# Patient Record
Sex: MALE | URBAN - METROPOLITAN AREA
[De-identification: names, ages, dates, MRNs, and addresses within clinical notes are randomized per-mention and may not be internally consistent; named-entity substitution may affect disease eponyms.]

---

## 2021-05-30 ENCOUNTER — RECORDS - HEALTHEAST (OUTPATIENT)
Dept: ADMINISTRATIVE | Facility: CLINIC | Age: 26
End: 2021-05-30

## 2023-01-07 ENCOUNTER — OFFICE VISIT (OUTPATIENT)
Dept: URGENT CARE | Facility: URGENT CARE | Age: 28
End: 2023-01-07
Payer: COMMERCIAL

## 2023-01-07 VITALS
DIASTOLIC BLOOD PRESSURE: 75 MMHG | SYSTOLIC BLOOD PRESSURE: 136 MMHG | RESPIRATION RATE: 16 BRPM | HEART RATE: 65 BPM | OXYGEN SATURATION: 99 % | TEMPERATURE: 97.7 F | WEIGHT: 230 LBS

## 2023-01-07 DIAGNOSIS — K13.79 MOUTH PAIN: Primary | ICD-10-CM

## 2023-01-07 PROCEDURE — 99203 OFFICE O/P NEW LOW 30 MIN: CPT

## 2023-01-07 RX ORDER — IBUPROFEN 600 MG/1
TABLET, FILM COATED ORAL
COMMUNITY
Start: 2023-01-04

## 2023-01-07 RX ORDER — CHLORHEXIDINE GLUCONATE ORAL RINSE 1.2 MG/ML
15 SOLUTION DENTAL 2 TIMES DAILY
Qty: 473 ML | Refills: 0 | Status: SHIPPED | OUTPATIENT
Start: 2023-01-07 | End: 2023-01-14

## 2023-01-07 RX ORDER — HYDROCODONE BITARTRATE AND ACETAMINOPHEN 5; 325 MG/1; MG/1
1 TABLET ORAL EVERY 6 HOURS PRN
COMMUNITY
Start: 2023-01-04

## 2023-01-07 RX ORDER — DIPHENHYDRAMINE HYDROCHLORIDE AND LIDOCAINE HYDROCHLORIDE AND ALUMINUM HYDROXIDE AND MAGNESIUM HYDRO
5-10 KIT EVERY 6 HOURS PRN
Qty: 237 ML | Refills: 0 | Status: SHIPPED | OUTPATIENT
Start: 2023-01-07

## 2023-01-07 NOTE — PROGRESS NOTES
URGENT CARE  Assessment & Plan   Assessment:   Jose G Gonzalez is a 27 year old male who's clinical presentation today is consistent with:   1. Mouth pain  - magic mouthwash suspension, diphenhydrAMINE, lidocaine, aluminum-magnesium & simethicone, (FIRST-MOUTHWASH BLM) compounding kit; Swish and swallow 5-10 mLs in mouth every 6 hours as needed for mouth sores  Dispense: 237 mL; Refill: 0  - chlorhexidine (PERIDEX) 0.12 % solution; Swish and spit 15 mLs in mouth 2 times daily for 7 days  Dispense: 473 mL; Refill: 0    No alarm signs or symptoms present   Differential Diagnoses for this patient's CC include    dental abscess,  dental infection   Plan:  No signs of an acute dental or gingival infection today (no signs of abscess, erythremia or edema) but very poor dentition noted), thus, will treat patient's dental pain at this time with supportive and symptomatic measures   Encourage patient to try ibuprofen/Tylenol as needed for pain control.  Also educated patient that there are many over the counter dental preparations that he apply directly to the affected tooth or gum, which are very good in taking away the pain, such as Orajel. Also encouraged her to try a heating pad or hot pack at least 3 times daily, to help relieve the discomfort.  Will prescribe patient 'magic mouth wash', with numbing medications to help relieve her pain and a Peridex solution to help prevent future bacterial overgrowth.   Educated patient he needs to acquire a dental appointment as soon as possible with his dentist for further evaluation following his procedure he had 3 days ago.   We also discussed prevention w/ proper oral hygiene, drinking fluoridated water, and having regular dental checks.   Discussed that patient should return for follow up if he notes any increased swelling, redness, increased facial pain, or an abscess to the gums OR symptoms do not improve after starting today's treatment (or if symptoms worsen) in 7-10  days.    Patient is agreeable to treatment plan and state they will follow-up if symptoms do not improve and/or if symptoms worsen (see patient's AVS 'monitor for' section for specific patient instructions given and discussed regarding what to watch for and when to follow up)    Medications ordered are listed above, please see AVS for patient's specific and personalized discharge instructions given     BHARGAVI Ma [facility]      ______________________________________________________________________        Subjective  Subjective     HPI: Jose G Gonzalez  is a 27 year old  male who presents today for evaluation the following concerns:   Patient presents endorsing dental pain and swelling in the lower right aspect of the jaw; patient endorses he had a tooth extracted by the dentist 3 days ago on 1/4/2023.  At that time patient states he was prescribed Norco and has been taking Tylenol and ibuprofen for pain.  Patient states he used all the Norco and has been taking Tylenol and ibuprofen and is still having pain/throbbing.  Patient presents today wanting to know if we can prescribe more pain medicine for him  They describe their pain as:throbbing   Cause of dental pain: recent dental work, cavities, poor dentition and  poor dental hygiene.   Patient has a history of substance abuse        Allergies   Allergen Reactions     Cephalexin Rash     Penicillins Rash     Per patient     There is no problem list on file for this patient.      Review of Systems:  Pertinent review of systems as reflected in HPI, otherwise negative.     Objective  Objective    Physical Exam:  Vitals:    01/07/23 1156   BP: 136/75   Pulse: 65   Resp: 16   Temp: 97.7  F (36.5  C)   SpO2: 99%   Weight: 104.3 kg (230 lb)      General: Alert and oriented, no acute distress, Vital signs reviewed: afebrile,  normotensive  SKIN: Intact, no rashes  EYES: EOMs intact, PERRLA bilaterally   Conjunctiva: Clear  bilaterally, no injection or erythema present  EARS: TMs intact, translucent gray in color with normal landmarks present no erythema  or bulging tympanic membrane   Canals are without swelling, however have a mild amount of cerumen, no impaction  NOSE:  mucosa moist                No frontal or maxillary sinus tenderness present bilaterally  MOUTH/THROAT:    Mouth:   No intraoral erythema and inflammation noted, poor dentition seen throughout. Caries noted w/ pacheco spots, pits and fissures} located throughout teeth   No fluctuance, no swelling seen, no signs of abscess,    noted tooth extraction site at right side of lower jaw with acute tooth sensitivity to percussion   No signs of infection noted, no purulence  mastication tolerable to provocation, no uvular deviation noted   no friability of gums,  no gingivitis or pulpitis concerns    Throat:     Posterior oropharynx is t without erythremia, exudate, lesions or tonsillar  Edema, no dysphonia   NECK: supple, has full range of motion with no meningeal signs              No lymphadenopathy present  LUNG: normal work of breathing, good respiratory effort without retractions, good air  movement, non labored, inspection reveals normal chest expansion w/  inspiration               I explained my diagnostic considerations and recommendations to the patient, who voiced understanding and agreement with the treatment plan.   All questions were answered.   We discussed potential side effects, risks and benefits of any prescribed or recommended therapies, as well as expectations for response to treatments.  Please see AVS for any patient instructions & handouts given.   Patient was advised to contact the Nurse Care Line, their Primary Care provider, Urgent Care, or the Emergency Department if there are new or worsening symptoms, or call 911 for emergencies.        ______________________________________________________________________          Patient Instructions    Diagnosis: Dental pain from tooth extraction    Today        Plan:      Acetaminophen or ibuprofen     Magic mouth wash} Peridex mouthwash}     Orajel     NEED to see a Dentist ASAP - Monday when opens s    Monitor for:     Fever of 101 F    More or increased redness, drainage, or swelling in the treated area    Face or jawbone swelling     Pain that is worsening             Dental Abscess vs Dental pain   An abscess is a sac of fluid (pus). A dental abscess forms when a tooth or the tissue around it becomes infected with bacteria.   The bacteria can enter through a cavity or a crack in a tooth and causes an infection.    It can also infect the gum tissue or bone around a tooth.   An untreated abscess can cause the loss of the tooth. It can even spread to other parts of the body and become life-threatening.  Symptoms of a dental abscess and Dental pain:     Toothache, often severe    Tooth pain with hot, cold, or pressure    Pain in the gums, cheek, or jaw    Bad breath or bitter taste in the mouth    Trouble swallowing or opening the mouth    Swollen or enlarged neck glands    infection: fever, redness, swelling, pus filled pocket, drainage, abscess

## 2023-01-07 NOTE — PATIENT INSTRUCTIONS
Diagnosis: Dental pain from tooth extraction    Today        Plan:    Acetaminophen or ibuprofen   Magic mouth wash} Peridex mouthwash}   Orajel   NEED to see a Dentist ASAP - Monday when opens s    Monitor for:   Fever of 101 F  More or increased redness, drainage, or swelling in the treated area  Face or jawbone swelling   Pain that is worsening             Dental Abscess vs Dental pain   An abscess is a sac of fluid (pus). A dental abscess forms when a tooth or the tissue around it becomes infected with bacteria.   The bacteria can enter through a cavity or a crack in a tooth and causes an infection.    It can also infect the gum tissue or bone around a tooth.   An untreated abscess can cause the loss of the tooth. It can even spread to other parts of the body and become life-threatening.  Symptoms of a dental abscess and Dental pain:   Toothache, often severe  Tooth pain with hot, cold, or pressure  Pain in the gums, cheek, or jaw  Bad breath or bitter taste in the mouth  Trouble swallowing or opening the mouth  Swollen or enlarged neck glands  infection: fever, redness, swelling, pus filled pocket, drainage, abscess

## 2023-04-21 ENCOUNTER — OFFICE VISIT (OUTPATIENT)
Dept: URGENT CARE | Facility: URGENT CARE | Age: 28
End: 2023-04-21
Payer: OTHER MISCELLANEOUS

## 2023-04-21 ENCOUNTER — ANCILLARY PROCEDURE (OUTPATIENT)
Dept: GENERAL RADIOLOGY | Facility: CLINIC | Age: 28
End: 2023-04-21
Attending: PHYSICIAN ASSISTANT
Payer: COMMERCIAL

## 2023-04-21 VITALS
HEART RATE: 68 BPM | DIASTOLIC BLOOD PRESSURE: 83 MMHG | RESPIRATION RATE: 16 BRPM | TEMPERATURE: 98.4 F | SYSTOLIC BLOOD PRESSURE: 128 MMHG | OXYGEN SATURATION: 96 %

## 2023-04-21 DIAGNOSIS — S93.401A SPRAIN OF RIGHT ANKLE, UNSPECIFIED LIGAMENT, INITIAL ENCOUNTER: Primary | ICD-10-CM

## 2023-04-21 PROCEDURE — 99213 OFFICE O/P EST LOW 20 MIN: CPT | Performed by: PHYSICIAN ASSISTANT

## 2023-04-21 PROCEDURE — 73630 X-RAY EXAM OF FOOT: CPT | Mod: TC | Performed by: RADIOLOGY

## 2023-04-21 PROCEDURE — 73610 X-RAY EXAM OF ANKLE: CPT | Mod: TC | Performed by: RADIOLOGY

## 2023-04-21 RX ORDER — IBUPROFEN 800 MG/1
800 TABLET, FILM COATED ORAL EVERY 8 HOURS PRN
Qty: 30 TABLET | Refills: 0 | Status: SHIPPED | OUTPATIENT
Start: 2023-04-21

## 2023-04-21 NOTE — PROGRESS NOTES
SUBJECTIVE:   Jose G Gonzalez is a 27 year old male presenting with a chief complaint of   Chief Complaint   Patient presents with     Work Comp     Hurt right foot today at work       He is an established patient of [facility].  Patient presents with right foot and ankle pain after rolling the ankle while stepping on some carpeted stairs this morning.  Patient states no immediate pain.  Approximately 30 minutes after incident,  Started to hurt and patient began to have more pain.  Per patient, entire foot and ankle hurts.  No other injuries.  No previous injury to right ankle.     Treatment:  1 g tylenol and 400 mg ibuprofen.        Review of Systems    Past Medical History:   Diagnosis Date     Depressive disorder      Substance abuse (H)      History reviewed. No pertinent family history.  Current Outpatient Medications   Medication Sig Dispense Refill     Acetaminophen (TYLENOL PO) Take 325 mg by mouth       ibuprofen (ADVIL/MOTRIN) 600 MG tablet TAKE 1 TABLET BY MOUTH EVERY 6 TO 8 HOURS AS NEEDED FOR PAIN.       ibuprofen (ADVIL/MOTRIN) 800 MG tablet Take 1 tablet (800 mg) by mouth every 8 hours as needed for moderate pain 30 tablet 0     Buprenorphine HCl-Naloxone HCl (SUBOXONE) 4-1 MG film Place 1 Film under the tongue daily (Patient not taking: Reported on 1/7/2023)       citalopram (CELEXA) 10 MG tablet Take 2 tablets (20 mg) by mouth daily (Patient not taking: Reported on 1/7/2023) 30 tablet 1     HYDROcodone-acetaminophen (NORCO) 5-325 MG tablet Take 1 tablet by mouth every 6 hours as needed (Patient not taking: Reported on 4/21/2023)       magic mouthwash suspension, diphenhydrAMINE, lidocaine, aluminum-magnesium & simethicone, (FIRST-MOUTHWASH BLM) compounding kit Swish and swallow 5-10 mLs in mouth every 6 hours as needed for mouth sores (Patient not taking: Reported on 4/21/2023) 237 mL 0     Social History     Tobacco Use     Smoking status: Former     Packs/day: 0.50     Types: Cigarettes      Smokeless tobacco: Not on file   Vaping Use     Vaping status: Not on file   Substance Use Topics     Alcohol use: No       OBJECTIVE  /83   Pulse 68   Temp 98.4  F (36.9  C) (Tympanic)   Resp 16   SpO2 96%     Physical Exam  Vitals and nursing note reviewed.   Constitutional:       Appearance: Normal appearance. He is normal weight.   Cardiovascular:      Rate and Rhythm: Normal rate.   Musculoskeletal:      Comments: Right foot.:  No edema or ecchymosis.  Slightly decreased ROM at all ROMs.  No tenderness to palpation of the medial or lateral malleolus.  DP present.  Tenderness to palpation of the 5th tuberosity.   Digit ROM normal.    Patient not observed ambulating.  Bunion noted.   Skin:     Capillary Refill: Capillary refill takes less than 2 seconds.   Neurological:      General: No focal deficit present.      Mental Status: He is alert.   Psychiatric:         Mood and Affect: Mood normal.         Labs:  Results for orders placed or performed in visit on 04/21/23 (from the past 24 hour(s))   XR Ankle Right G/E 3 Views    Narrative    ANKLE RIGHT THREE OR MORE VIEWS   DATE/TIME: 4/21/2023 2:58 PM    INDICATION: Sprain of right ankle, unspecified ligament, initial  encounter.    COMPARISON: None available.       Impression    IMPRESSION: Normal joint spaces and alignment. No definite fracture.  No significant soft tissue swelling.      XR Foot Right G/E 3 Views    Narrative    FOOT RIGHT THREE OR MORE VIEWS   DATE/TIME: 4/21/2023 2:58 PM    INDICATION: Sprain of right ankle, unspecified ligament, initial  encounter.    COMPARISON: None available.       Impression    IMPRESSION: Moderate hallux valgus deformity with bunion. No acute  displaced right foot fracture or dislocation. No significant joint  space narrowing. No localizing right foot soft tissue swelling.          X-Ray was done, my findings are: NAD    ASSESSMENT:      ICD-10-CM    1. Sprain of right ankle, unspecified ligament, initial  encounter  S93.401A XR Foot Right G/E 3 Views     XR Ankle Right G/E 3 Views     ibuprofen (ADVIL/MOTRIN) 800 MG tablet     Orthopedic  Referral     Ankle/Foot Bracing Supplies DME Walking Boot; Right; Pneumatic           Medical Decision Making:    Differential Diagnosis:  MS Injury Pain: sprain and fracture    Serious Comorbid Conditions:  Adult:  reviewed    PLAN:    Patient placed in a cam walker.  Ortho referral.  Rx for ibuprofen 800 TID prn with food.  Note for work.      Followup:    If not improving or if condition worsens, follow up with your Primary Care Provider, In 4  day(s) follow up with  Ortho    There are no Patient Instructions on file for this visit.

## 2023-04-21 NOTE — LETTER
April 21, 2023      Jose G Gonzalez  [id number] [facility] [facility] [id number]        To Whom It May Concern:    Jose G Gonzalez was seen in our clinic. He may not return to work until cleared by ortho.        Sincerely,        Maile Grossman

## 2023-04-24 ENCOUNTER — ANCILLARY PROCEDURE (OUTPATIENT)
Dept: GENERAL RADIOLOGY | Facility: CLINIC | Age: 28
End: 2023-04-24
Attending: PEDIATRICS
Payer: COMMERCIAL

## 2023-04-24 ENCOUNTER — OFFICE VISIT (OUTPATIENT)
Dept: ORTHOPEDICS | Facility: CLINIC | Age: 28
End: 2023-04-24
Attending: PHYSICIAN ASSISTANT
Payer: OTHER MISCELLANEOUS

## 2023-04-24 VITALS — HEART RATE: 63 BPM | SYSTOLIC BLOOD PRESSURE: 125 MMHG | DIASTOLIC BLOOD PRESSURE: 70 MMHG | WEIGHT: 230 LBS

## 2023-04-24 DIAGNOSIS — S99.911A INJURY OF RIGHT ANKLE, INITIAL ENCOUNTER: ICD-10-CM

## 2023-04-24 DIAGNOSIS — S93.401A SPRAIN OF RIGHT ANKLE, UNSPECIFIED LIGAMENT, INITIAL ENCOUNTER: ICD-10-CM

## 2023-04-24 PROCEDURE — 99204 OFFICE O/P NEW MOD 45 MIN: CPT | Performed by: PEDIATRICS

## 2023-04-24 PROCEDURE — 73630 X-RAY EXAM OF FOOT: CPT | Mod: TC | Performed by: RADIOLOGY

## 2023-04-24 PROCEDURE — 73610 X-RAY EXAM OF ANKLE: CPT | Mod: TC | Performed by: RADIOLOGY

## 2023-04-24 NOTE — PROGRESS NOTES
ASSESSMENT & PLAN    Jose G was seen today for pain, injury, pain and injury.    Diagnoses and all orders for this visit:    Injury of right ankle, initial encounter  -     Orthopedic  Referral  -     XR Ankle RT G/E 3 vw; Future  -     XR Foot RT G/E 3 vw      This issue is acute and Unchanged.    Likely lateral ankle sprain given negative x-rays.  Recommend supportive care including cam walking boot, crutches, rest, ice, elevation. Follow up in 1-2 weeks for repeat exam. Would consider further imaging at that point pending clinical course. Patient will likely wean to ankle brace and need physical therapy at some point.    Plan:  - Today's Plan of Care:  Walking boot crutches if needed  Continue with relative rest and activity modification, Ice, Compression, and Elevation.  Can apply ice 10-15 minutes 3-4 times per day as needed. OTC medications as needed.    Home Exercise Program    Work Letter    -We also discussed other future treatment options:  Referral to Physical Therapy  Ankle Brace  MRI if not improving    Follow Up: 2 weeks - exam before x-rays    Concerning signs and symptoms were reviewed.  The patient expressed understanding of this management plan and all questions were answered at this time.    Isbaelle Bentley MD [facility]  Sports Medicine Physician  [facility]      -----  Chief Complaint   Patient presents with     Right Ankle - Pain, Injury     Right Foot - Pain, Injury       SUBJECTIVE  Jose G Gonzalez is a/an 27 year old male who is seen as an  referral for evaluation of right ankle injury.  Note; will need to discuss workability     The patient is seen by themselves.    Onset: 3 day(s) ago. Patient describes injury at work. Slipped on carpeted stairs, inverted ankle  Location of Pain: right ankle and foot; talar dome, dorsum of the ankle, and plantar of the foot   Worsened by: walking, Is walking differently to help alleviate pain,  putting pressure, PF/DF   Better  with: cam walking boot, elevation, resting, icing, ibuprofen  Treatments tried: rest/activity avoidance, elevation, ice, ibuprofen, previous imaging (xray 4/21/22) and casting/splinting/bracing  Associated symptoms: weakness of right ankle and feeling of instability    Orthopedic/Surgical history: YES - Date: sprained ankle when he was a kid  Social History/Occupation: works in furniture delivery     No family history pertinent to patient's problem today.    REVIEW OF SYSTEMS:  Review of Systems  Skin: no bruising, yes mild swelling  Musculoskeletal: as above  Neurologic: no numbness, paresthesias  Remainder of review of systems is negative including constitutional, CV, pulmonary, GI, except as noted in HPI or medical history.    OBJECTIVE:  /70   Pulse 63   Wt 104.3 kg (230 lb)    General: healthy, alert and in no distress  HEENT: no scleral icterus or conjunctival erythema  Skin: no suspicious lesions or rash. No jaundice.  CV: distal perfusion intact  Resp: normal respiratory effort without conversational dyspnea   Psych: normal mood and affect  Gait: antalgic  Neuro: Normal light sensory exam of lower extremity    Bilateral Foot and Ankle Exam:    Inspection:       ecchymosis noted mild lateral ankle       edema noted mild lateral ankle    Foot inspection:       no deformity noted    Tender:       lateral ankle ligaments right    Non-Tender:       remainder of ankle and foot right    ROM:      Slightly decreased limited ankle dorsiflexion, plantarflexion, inversion and eversion right    Strength:       ankle dorsiflexion 4/5 right       plantarflexion 4/5 right       inversion 4/5 right       eversion 4/5 right    Special Tests:       neg (-) anterior drawer right       neg (-) talar tilt right       neg (-) external rotation testing right       neg (-) squeeze test right    Gait:       antalgic gait    Neurovascular:       2+ peripheral pulses bilaterally and brisk capillary refill       sensation  grossly intact      RADIOLOGY:  I independently ordered, visualized and reviewed these images with the patient  3 x-rays XR views of left ankle and foot reviewed: no acute bony abnormality, no significant degenerative change, hallux valgus  - will follow official read    Reviewed urgent care note  Review of the result(s) of each unique test - XR

## 2023-04-24 NOTE — LETTER
April 24, 2023      Jose G Gonzalez  [address] [address] [facility] [address]        To Whom It May Concern,     Jose G is under my care for ankle injury, he should be off work at this time.  Follow up will be in 2 weeks      Sincerely,        Isabelle Bentley MD

## 2023-04-24 NOTE — PATIENT INSTRUCTIONS
Likely lateral ankle sprain given negative x-rays.  Recommend supportive care including cam walking boot, crutches, rest, ice, elevation. Follow up in 1-2 weeks for repeat exam. Would consider further imaging at that point pending clinical course. Patient will likely wean to ankle brace and need physical therapy at some point.    Plan:  - Today's Plan of Care:  Walking boot crutches if needed  Continue with relative rest and activity modification, Ice, Compression, and Elevation.  Can apply ice 10-15 minutes 3-4 times per day as needed. OTC medications as needed.    Home Exercise Program    Work Letter    -We also discussed other future treatment options:  Referral to Physical Therapy  Ankle Brace  MRI if not improving    Follow Up: 2 weeks - exam before x-rays    If you have any further questions for your physician or physician s care team you can call [phone] and use option 3 to leave a voice message.

## 2023-04-24 NOTE — LETTER
4/24/2023         RE: Jose G Gonzalez  [address] [address] [address]        Dear Colleague,    Thank you for referring your patient, Jose G Gonzalez, to the [facility]. Please see a copy of my visit note below.    ASSESSMENT & PLAN    Jose G was seen today for pain, injury, pain and injury.    Diagnoses and all orders for this visit:    Injury of right ankle, initial encounter  -     Orthopedic  Referral  -     XR Ankle RT G/E 3 vw; Future  -     XR Foot RT G/E 3 vw      This issue is acute and Unchanged.    Likely lateral ankle sprain given negative x-rays.  Recommend supportive care including cam walking boot, crutches, rest, ice, elevation. Follow up in 1-2 weeks for repeat exam. Would consider further imaging at that point pending clinical course. Patient will likely wean to ankle brace and need physical therapy at some point.    Plan:  - Today's Plan of Care:  Walking boot crutches if needed  Continue with relative rest and activity modification, Ice, Compression, and Elevation.  Can apply ice 10-15 minutes 3-4 times per day as needed. OTC medications as needed.    Home Exercise Program    Work Letter    -We also discussed other future treatment options:  Referral to Physical Therapy  Ankle Brace  MRI if not improving    Follow Up: 2 weeks - exam before x-rays    Concerning signs and symptoms were reviewed.  The patient expressed understanding of this management plan and all questions were answered at this time.    Isabelle Bentley MD [facility]  Sports Medicine Physician  [facility]      -----  Chief Complaint   Patient presents with     Right Ankle - Pain, Injury     Right Foot - Pain, Injury       SUBJECTIVE  Jose G Gonzalez is a/an 27 year old male who is seen as an  referral for evaluation of right ankle injury.  Note; will need to discuss workability     The patient is seen by themselves.    Onset: 3 day(s) ago. Patient  describes injury at work. Slipped on carpeted stairs, inverted ankle  Location of Pain: right ankle and foot; talar dome, dorsum of the ankle, and plantar of the foot   Worsened by: walking, Is walking differently to help alleviate pain,  putting pressure, PF/DF   Better with: cam walking boot, elevation, resting, icing, ibuprofen  Treatments tried: rest/activity avoidance, elevation, ice, ibuprofen, previous imaging (xray 4/21/22) and casting/splinting/bracing  Associated symptoms: weakness of right ankle and feeling of instability    Orthopedic/Surgical history: YES - Date: sprained ankle when he was a kid  Social History/Occupation: works in furniture delivery     No family history pertinent to patient's problem today.    REVIEW OF SYSTEMS:  Review of Systems  Skin: no bruising, yes mild swelling  Musculoskeletal: as above  Neurologic: no numbness, paresthesias  Remainder of review of systems is negative including constitutional, CV, pulmonary, GI, except as noted in HPI or medical history.    OBJECTIVE:  /70   Pulse 63   Wt 104.3 kg (230 lb)    General: healthy, alert and in no distress  HEENT: no scleral icterus or conjunctival erythema  Skin: no suspicious lesions or rash. No jaundice.  CV: distal perfusion intact  Resp: normal respiratory effort without conversational dyspnea   Psych: normal mood and affect  Gait: antalgic  Neuro: Normal light sensory exam of lower extremity    Bilateral Foot and Ankle Exam:    Inspection:       ecchymosis noted mild lateral ankle       edema noted mild lateral ankle    Foot inspection:       no deformity noted    Tender:       lateral ankle ligaments right    Non-Tender:       remainder of ankle and foot right    ROM:      Slightly decreased limited ankle dorsiflexion, plantarflexion, inversion and eversion right    Strength:       ankle dorsiflexion 4/5 right       plantarflexion 4/5 right       inversion 4/5 right       eversion 4/5 right    Special Tests:        neg (-) anterior drawer right       neg (-) talar tilt right       neg (-) external rotation testing right       neg (-) squeeze test right    Gait:       antalgic gait    Neurovascular:       2+ peripheral pulses bilaterally and brisk capillary refill       sensation grossly intact      RADIOLOGY:  I independently ordered, visualized and reviewed these images with the patient  3 x-rays XR views of left ankle and foot reviewed: no acute bony abnormality, no significant degenerative change, hallux valgus  - will follow official read    Reviewed urgent care note  Review of the result(s) of each unique test - XR             Again, thank you for allowing me to participate in the care of your patient.        Sincerely,        Isabelle Bentley MD

## 2023-05-08 ENCOUNTER — OFFICE VISIT (OUTPATIENT)
Dept: ORTHOPEDICS | Facility: CLINIC | Age: 28
End: 2023-05-08
Payer: OTHER MISCELLANEOUS

## 2023-05-08 VITALS — WEIGHT: 230 LBS | SYSTOLIC BLOOD PRESSURE: 132 MMHG | HEART RATE: 63 BPM | DIASTOLIC BLOOD PRESSURE: 77 MMHG

## 2023-05-08 DIAGNOSIS — S93.401D SPRAIN OF RIGHT ANKLE, UNSPECIFIED LIGAMENT, SUBSEQUENT ENCOUNTER: Primary | ICD-10-CM

## 2023-05-08 PROCEDURE — 99213 OFFICE O/P EST LOW 20 MIN: CPT | Performed by: PEDIATRICS

## 2023-05-08 NOTE — LETTER
5/8/2023         RE: Jose G Gonzalez  [address] [facility] [address]        Dear Colleague,    Thank you for referring your patient, Jose G Gonzalez, to the [facility]. Please see a copy of my visit note below.    ASSESSMENT & PLAN    Jose G was seen today for pain and follow up.    Diagnoses and all orders for this visit:    Sprain of right ankle, unspecified ligament, subsequent encounter      This issue is acute and Improving.    Discussed the likely injuries associated with a lateral ankle sprain and typical treatment including rest from irritating activities and pain free weight bearing - transition to ankle brace.  Patient will need a gradual rehabilitation program focusing on range of motion, strengthening and proprioception - referral to physical therapy.  Discussed a gradual return to sports and activities once pain free, no swelling, full range of motion and full strength.  Follow up at this time is only as needed.  Recommended ankle brace use with activities for at least 6 months post injury.     Plan:  - Today's Plan of Care:  Transition to ankle brace  Work Letter  Gradual return to activities as tolerated    Rehab: Physical Therapy: [facility] - [phone]    Discussed activity considerations and other supportive care including Ice/Heat, OTC and other topical medications as needed.    Follow Up: 6 weeks if needed    Concerning signs and symptoms were reviewed.  The patient expressed understanding of this management plan and all questions were answered at this time.    Isabelle Bentley MD [facility]  Sports Medicine Physician  [facility]      SUBJECTIVE- Interim History May 8, 2023    Chief Complaint   Patient presents with     Right Ankle - Pain, Follow Up       Jose G Gonzalez is a 27 year old male who is seen in f/u up for Sprain of right ankle, unspecified ligament, subsequent encounter. Since last visit on 4/24/23  "patient has been doing pretty good. It started feeling better a couple days after he saw us, stopped using the walking boot. It doesn't really bother him, it just feels different now  Note; Will need updated workability note  - Now ~ 2 weeks, 2 days from initial onset    Worsened by: not sure.   Better with: resting, walking boot, elevation, ibuprofen  Treatments tried: elevation, ice, ibuprofen, home exercises, previous imaging (xray 4/2/23) and casting/splinting/bracing  Associated symptoms:  weakness of right ankle and feeling of instability, \"feels different\"     The patient is seen by themselves.    Social History/Occupation: furniture delivery     No family history pertinent to patient's problem today.    REVIEW OF SYSTEMS:  Review of Systems  Skin: no bruising, no swelling  Musculoskeletal: as above  Neurologic: no numbness, paresthesias  Remainder of review of systems is negative including constitutional, CV, pulmonary, GI, except as noted in HPI or medical history.    OBJECTIVE:  /77   Pulse 63   Wt 104.3 kg (230 lb)      GENERAL APPEARANCE: healthy, alert and no distress   GAIT: NORMAL  SKIN: no suspicious lesions or rashes  HEENT: Sclera clear, anicteric  CV: no lower extremity edema, good peripheral pulses  RESP: Breathing not labored  NEURO: Normal strength and tone, mentation intact and speech normal  PSYCH:  mentation appears normal and affect normal/bright    Bilateral Foot and Ankle Exam:    Inspection:       no visible ecchymosis       no visible edema or effusion    Foot inspection:       pes planus       ankle pronation    Tender:       none    Non-Tender:       remainder of ankle and foot bilateral    ROM:        Full active and passive ROM, ankle dorsiflexion, plantarflexion, inversion, eversion, great toe dorsiflexion, remainder of toes, midfoot and subtalar bilateral    Strength:       ankle dorsiflexion 5/5 right       plantarflexion 5/5 right       inversion 5/5 right       eversion " 5/5 right    Special Tests:       neg (-) anterior drawer right       neg (-) talar tilt right    Gait:       normal    Lower extremity alignment:       Normal    Neurovascular:       2+ peripheral pulses bilaterally and brisk capillary refill       sensation grossly intact      RADIOLOGY:  Final results and radiologist's interpretation, available in the [facility] health record.  Images were reviewed with the patient in the office today.  My personal interpretation of the performed imaging:    3 x-rays XR views of left ankle and foot reviewed 4/24/2023: no acute bony abnormality, no significant degenerative change, hallux valgus  - will follow official read    Review of the result(s) of each unique test - XR             Again, thank you for allowing me to participate in the care of your patient.        Sincerely,        Isabelle Bentley MD

## 2023-05-08 NOTE — PROGRESS NOTES
ASSESSMENT & PLAN    Jose G was seen today for pain and follow up.    Diagnoses and all orders for this visit:    Sprain of right ankle, unspecified ligament, subsequent encounter      This issue is acute and Improving.    Discussed the likely injuries associated with a lateral ankle sprain and typical treatment including rest from irritating activities and pain free weight bearing - transition to ankle brace.  Patient will need a gradual rehabilitation program focusing on range of motion, strengthening and proprioception - referral to physical therapy.  Discussed a gradual return to sports and activities once pain free, no swelling, full range of motion and full strength.  Follow up at this time is only as needed.  Recommended ankle brace use with activities for at least 6 months post injury.     Plan:  - Today's Plan of Care:  Transition to ankle brace  Work Letter  Gradual return to activities as tolerated    Rehab: Physical Therapy: [facility] - [phone]    Discussed activity considerations and other supportive care including Ice/Heat, OTC and other topical medications as needed.    Follow Up: 6 weeks if needed    Concerning signs and symptoms were reviewed.  The patient expressed understanding of this management plan and all questions were answered at this time.    Isabelle Bentley MD [facility]  Sports Medicine Physician  [facility]      SUBJECTIVE- Interim History May 8, 2023    Chief Complaint   Patient presents with     Right Ankle - Pain, Follow Up       Jose G Gonzalez is a 27 year old male who is seen in f/u up for Sprain of right ankle, unspecified ligament, subsequent encounter. Since last visit on 4/24/23 patient has been doing pretty good. It started feeling better a couple days after he saw us, stopped using the walking boot. It doesn't really bother him, it just feels different now  Note; Will need updated workability note  - Now ~ 2 weeks, 2 days from initial  "onset    Worsened by: not sure.   Better with: resting, walking boot, elevation, ibuprofen  Treatments tried: elevation, ice, ibuprofen, home exercises, previous imaging (xray 4/2/23) and casting/splinting/bracing  Associated symptoms:  weakness of right ankle and feeling of instability, \"feels different\"     The patient is seen by themselves.    Social History/Occupation: furniture delivery     No family history pertinent to patient's problem today.    REVIEW OF SYSTEMS:  Review of Systems  Skin: no bruising, no swelling  Musculoskeletal: as above  Neurologic: no numbness, paresthesias  Remainder of review of systems is negative including constitutional, CV, pulmonary, GI, except as noted in HPI or medical history.    OBJECTIVE:  /77   Pulse 63   Wt 104.3 kg (230 lb)      GENERAL APPEARANCE: healthy, alert and no distress   GAIT: NORMAL  SKIN: no suspicious lesions or rashes  HEENT: Sclera clear, anicteric  CV: no lower extremity edema, good peripheral pulses  RESP: Breathing not labored  NEURO: Normal strength and tone, mentation intact and speech normal  PSYCH:  mentation appears normal and affect normal/bright    Bilateral Foot and Ankle Exam:    Inspection:       no visible ecchymosis       no visible edema or effusion    Foot inspection:       pes planus       ankle pronation    Tender:       none    Non-Tender:       remainder of ankle and foot bilateral    ROM:        Full active and passive ROM, ankle dorsiflexion, plantarflexion, inversion, eversion, great toe dorsiflexion, remainder of toes, midfoot and subtalar bilateral    Strength:       ankle dorsiflexion 5/5 right       plantarflexion 5/5 right       inversion 5/5 right       eversion 5/5 right    Special Tests:       neg (-) anterior drawer right       neg (-) talar tilt right    Gait:       normal    Lower extremity alignment:       Normal    Neurovascular:       2+ peripheral pulses bilaterally and brisk capillary refill       sensation " grossly intact      RADIOLOGY:  Final results and radiologist's interpretation, available in the [facility] health record.  Images were reviewed with the patient in the office today.  My personal interpretation of the performed imaging:    3 x-rays XR views of left ankle and foot reviewed 4/24/2023: no acute bony abnormality, no significant degenerative change, hallux valgus  - will follow official read    Review of the result(s) of each unique test - XR

## 2023-05-08 NOTE — PATIENT INSTRUCTIONS
Discussed the likely injuries associated with a lateral ankle sprain and typical treatment including rest from irritating activities and pain free weight bearing - transition to ankle brace.  Patient will need a gradual rehabilitation program focusing on range of motion, strengthening and proprioception - referral to physical therapy.  Discussed a gradual return to sports and activities once pain free, no swelling, full range of motion and full strength.  Follow up at this time is only as needed.  Recommended ankle brace use with activities for at least 6 months post injury.     Plan:  - Today's Plan of Care:  Transition to ankle brace  Work Letter  Gradual return to activities as tolerated    Rehab: Physical Therapy: [facility] - [phone]    Discussed activity considerations and other supportive care including Ice/Heat, OTC and other topical medications as needed.    Follow Up: 6 weeks if needed    If you have any further questions for your physician or physician s care team you can call [phone] and use option 3 to leave a voice message.

## 2023-05-08 NOTE — LETTER
May 8, 2023      Jose G Gonzalez  [address] [address] [address]        To Whom It May Concern,       Jose G is under my care for a right ankle injury.  He can return to work:  - Rest as needed  - Allow ankle brace use as needed    Start physical therapy, follow up in 6 weeks if needed.      Sincerely,        Isabelle Bentley MD

## 2024-04-08 ENCOUNTER — OFFICE VISIT (OUTPATIENT)
Dept: URGENT CARE | Facility: URGENT CARE | Age: 29
End: 2024-04-08
Payer: OTHER MISCELLANEOUS

## 2024-04-08 VITALS
TEMPERATURE: 97.8 F | SYSTOLIC BLOOD PRESSURE: 106 MMHG | OXYGEN SATURATION: 99 % | WEIGHT: 202 LBS | RESPIRATION RATE: 18 BRPM | DIASTOLIC BLOOD PRESSURE: 72 MMHG | HEART RATE: 60 BPM

## 2024-04-08 DIAGNOSIS — S61.412A LACERATION OF LEFT HAND, FOREIGN BODY PRESENCE UNSPECIFIED, INITIAL ENCOUNTER: Primary | ICD-10-CM

## 2024-04-08 PROCEDURE — 90715 TDAP VACCINE 7 YRS/> IM: CPT | Performed by: PHYSICIAN ASSISTANT

## 2024-04-08 PROCEDURE — 12001 RPR S/N/AX/GEN/TRNK 2.5CM/<: CPT | Performed by: PHYSICIAN ASSISTANT

## 2024-04-08 PROCEDURE — 90471 IMMUNIZATION ADMIN: CPT | Performed by: PHYSICIAN ASSISTANT

## 2024-04-08 NOTE — LETTER
April 8, 2024      Jose G Gonzalez  [address] [facility] [address]        To Whom It May Concern:    Jose G Gonzalez was seen in our clinic today. He may return to work on 4/9/24 with the following: left hand laceration must be covered until fully healed.        Sincerely,      Maile Grossman

## 2024-04-08 NOTE — NURSING NOTE
WC.  Patient at work today. About 1 hour ago he was drilling and his hand slipped and the drill bit went into the left hand in the web between the thumb and index finger.   Drill bit went all the way through and out the backside. Bleeding was heavy at first. Can move all 5 fingers. He poured some water on it and wrapped it with a small guaze and painters tape. No active bleeding at this time. Kept wound wrapped to see urgent care in 15 minutes.  Rates pain 5/10.    Chanel Sanchez BSN, RN     Statement Selected

## 2024-04-08 NOTE — NURSING NOTE
Clinic Administered Medication Documentation    Patient was given L.E.T. Prior to medication administration, verified patient's identity using patient's name and date of birth.    Liliana Restrepo, [facility]

## 2024-04-08 NOTE — PROGRESS NOTES
SUBJECTIVE:   Jose G Gonzalez is a 28 year old male presenting with a chief complaint of   Chief Complaint   Patient presents with    Urgent Care    Work Comp     Per patient states at work today he accidentally injured his left hand with drill     Patient Request for Note/Letter     Work is requesting note        He is an established patient of [facility].  Patient presents with left hand laceration that occurred while drilling at work today 90 minutes ago.  Following the incident, patient wrapped and reported to supervisor.   TOMI.      Laceration    Mechanism of injury: drill  History provided by: Patient  Time of injury was 1.5 hours(s) ago.    This is a work related injury.    Associated symptoms: Denies numbness, weakness, or loss of function    Last tetanus booster within 10 years: No    LACERATION EXAM:   Size of laceration: 1 centimeters  Characteristics of the laceration: clean  Depth of laceration: superficial  Tendon function intact: Yes  Sensation to light touch intact: Yes  Pulses/capillary refill intact: Yes  Foreign body: No    Picture included in patient's chart: yes    PROCEDURE NOTE:  Anesthesia: LET was applied  Prepped and draped in the usual sterile fashion  Wound irrigated with sterile water  Wound was explored for any foreign bodies and evaluated for tendon, nerve, vessel or joint involvement.    Closure was simple  Laceration was closed with 1 X 4.0 Nylon interrupted sutures  Bandage was applied  Patient tolerated the procedure well            Review of Systems    Past Medical History:   Diagnosis Date    Depressive disorder     Substance abuse (H)      History reviewed. No pertinent family history.  Current Outpatient Medications   Medication Sig Dispense Refill    Acetaminophen (TYLENOL PO) Take 325 mg by mouth      ibuprofen (ADVIL/MOTRIN) 600 MG tablet TAKE 1 TABLET BY MOUTH EVERY 6 TO 8 HOURS AS NEEDED FOR PAIN.      Buprenorphine HCl-Naloxone HCl (SUBOXONE) 4-1 MG film Place 1 Film  "under the tongue daily (Patient not taking: Reported on 1/7/2023)      citalopram (CELEXA) 10 MG tablet Take 2 tablets (20 mg) by mouth daily (Patient not taking: Reported on 1/7/2023) 30 tablet 1    HYDROcodone-acetaminophen (NORCO) 5-325 MG tablet Take 1 tablet by mouth every 6 hours as needed (Patient not taking: Reported on 4/21/2023)      ibuprofen (ADVIL/MOTRIN) 800 MG tablet Take 1 tablet (800 mg) by mouth every 8 hours as needed for moderate pain (Patient not taking: Reported on 4/8/2024) 30 tablet 0    magic mouthwash suspension, diphenhydrAMINE, lidocaine, aluminum-magnesium & simethicone, (FIRST-MOUTHWASH BLM) compounding kit Swish and swallow 5-10 mLs in mouth every 6 hours as needed for mouth sores (Patient not taking: Reported on 4/21/2023) 237 mL 0     Social History     Tobacco Use    Smoking status: Former     Packs/day: .5     Types: Cigarettes    Smokeless tobacco: Not on file   Substance Use Topics    Alcohol use: No       OBJECTIVE  /72   Pulse 60   Temp 97.8  F (36.6  C) (Tympanic)   Resp 18   Wt 91.6 kg (202 lb)   SpO2 99%     Physical Exam  Vitals reviewed.   Constitutional:       Appearance: Normal appearance. He is obese.   Eyes:      Extraocular Movements: Extraocular movements intact.      Conjunctiva/sclera: Conjunctivae normal.   Cardiovascular:      Rate and Rhythm: Normal rate.   Musculoskeletal:         General: Signs of injury present. Normal range of motion.   Skin:     Comments: Left hand:  thumb web with \"V\" shaped laceration 6-7 mm.  Superficial.   Neurological:      Mental Status: He is alert.         Labs:  No results found for this or any previous visit (from the past 24 hour(s)).    X-Ray was not done.    ASSESSMENT:      ICD-10-CM    1. Laceration of left hand, foreign body presence unspecified, initial encounter  S61.412A            Medical Decision Making:    Differential Diagnosis:  Laceration      Serious Comorbid Conditions:  Adult:   " reviewed    PLAN:    Laceration:    Keep wound clean and dry for the next 24-48 hours  Ok to shower and get wound wet after 48 hours, but do not soak for 2 weeks  Wound follow-up: Return for suture removal in 14 days  May return to work/school as long as wound is kept clean and dry  Discussed the probability of scarring  Active range of motion exercises encouraged for finger lacerations    Patient will return immediately if they experience redness around the laceration, drainage, worsening pain, or fever.   Tdap given.  Note for work.      Followup:    If not improving or if condition worsens, follow up with your Primary Care Provider, If not improving or if conditions worsens over the next 12-24 hours, go to the Emergency Department    There are no Patient Instructions on file for this visit.

## 2024-05-22 ENCOUNTER — TELEPHONE (OUTPATIENT)
Dept: NURSING | Facility: CLINIC | Age: 29
End: 2024-05-22
Payer: COMMERCIAL

## 2024-05-22 NOTE — TELEPHONE ENCOUNTER
Pt calling to find out options for getting a doctors note for his work so that he can stay home with vomiting and diarrhea. Pt was given options for appointments. Pt was given general hydration advice for his illness, but was not calling for full triage of his sx today.     Candida Abarca RN on 5/22/2024 at 8:11 AM

## 2025-02-05 ENCOUNTER — MYC MEDICAL ADVICE (OUTPATIENT)
Dept: UROLOGY | Facility: CLINIC | Age: 30
End: 2025-02-05
Payer: COMMERCIAL

## 2025-02-06 ENCOUNTER — VIRTUAL VISIT (OUTPATIENT)
Dept: UROLOGY | Facility: CLINIC | Age: 30
End: 2025-02-06
Payer: COMMERCIAL

## 2025-02-06 DIAGNOSIS — Z30.09 VASECTOMY EVALUATION: Primary | ICD-10-CM

## 2025-02-06 ASSESSMENT — PAIN SCALES - GENERAL: PAINLEVEL_OUTOF10: NO PAIN (0)

## 2025-02-06 NOTE — NURSING NOTE
Is the patient currently in the state of [place]? YES    Visit mode:VIDEO    If the visit is dropped, the patient can be reconnected by: VIDEO VISIT: Text to cell phone:   Telephone Information:   Mobile [phone]       Will anyone else be joining the visit? NO  (If patient encounters technical issues they should call [phone]    How would you like to obtain your AVS? MyChart    Are changes needed to the allergy or medication list? No    Are refills needed on medications prescribed by this physician? NO    Reason for visit: Consult    Yareli PATINO

## 2025-02-06 NOTE — PROGRESS NOTES
VASECTOMY CONSULTATION NOTE  DATE OF VISIT: 2/6/2025  [facility]   PATIENT NAME: Jose G Gonzalez    YOB: 1995      REASON FOR CONSULTATION: Mr. Jose G Gonzalez is a 29 year old year old gentleman who is seen today requesting a vasectomy. He has 3 children - 8, 7, 5, one on the way - due 4/28/25 - and he wishes to have a vasectomy for birth control. His wife is in agreement with this plan.     PAST MEDICAL HISTORY:   Past Medical History:   Diagnosis Date    Depressive disorder     Substance abuse (H)        PAST SURGICAL HISTORY:   Past Surgical History:   Procedure Laterality Date    ORTHOPEDIC SURGERY         MEDICATIONS:   Current Outpatient Medications:     Acetaminophen (TYLENOL PO), Take 325 mg by mouth, Disp: , Rfl:     ibuprofen (ADVIL/MOTRIN) 600 MG tablet, TAKE 1 TABLET BY MOUTH EVERY 6 TO 8 HOURS AS NEEDED FOR PAIN., Disp: , Rfl:     ALLERGIES:   Allergies   Allergen Reactions    Cephalexin Rash    Penicillins Rash     Per patient       FAMILY HISTORY: No family history on file.    SOCIAL HISTORY:   Social History     Socioeconomic History    Marital status: Single     Spouse name: Not on file    Number of children: Not on file    Years of education: Not on file    Highest education level: Not on file   Occupational History    Not on file   Tobacco Use    Smoking status: Former     Current packs/day: 0.50     Types: Cigarettes    Smokeless tobacco: Not on file   Vaping Use    Vaping status: Never Used   Substance and Sexual Activity    Alcohol use: No    Drug use: Yes     Types: Marijuana    Sexual activity: Yes     Partners: Female   Other Topics Concern    Not on file   Social History Narrative    Not on file     Social Drivers of Health     Financial Resource Strain: Not on file   Food Insecurity: Not on file   Transportation Needs: Not on file   Physical Activity: Not on file   Stress: Not on file   Social Connections: Not on file   Interpersonal Safety: Not on file   Housing  Stability: Not on file       PHYSICAL EXAM  Patient is a 29 year old  male   Vitals: There were no vitals taken for this visit.  There is no height or weight on file to calculate BMI.  General Appearance Adult:   Alert, no acute distress, oriented  Neuro: Alert, oriented, speech and mentation normal  Psych: affect and mood normal       DIAGNOSIS: Request for sterilization    PLAN: The risks of the procedure as well as expectations for recovery and outcomes were explained in detail to him.  He was counseled on the risks for bleeding infection and pain after the procedure. We discussed the risk of post-vasectomy pain syndrome.  He was instructed to continue to use contraception until he had proven azoospermia on a semen specimen.  This would normally be collected at least 3 months after the procedure. Also discussed the rare, but possible risk of re-canalization of the vas, even after successful vasectomy with sterile semen specimen.  He was instructed to hold all anticoagulants medications for one week prior to the procedure.  It was recommended that he have someone else drive him home after his vasectomy.  In light of these risks and expectations he would like to proceed.  We are scheduling a vasectomy in the office in the near future.    Pt. Understands:  -1/1000-1/3000 risk of future pregnancy even with perfectly done vasectomy  -vasectomy is a permanent procedure    -he may cryopreserve sperm if he wishes   -1-5% risk of post-vasectomy pain syndrome   -1-5% risk of complication, primarily infection or bleeding  - he needs to have a semen sample that shows no sperm before getting approval for unprotected intercourse.      Thank you for the kind consultation.    Time spent: 12 minutes spent on the date of the encounter doing chart review, history and exam, documentation and further activities as noted above.     Tim Yi MD   Urology  [facility]  Clinic Phone  [phone]          Virtual Visit Details    Type of service:  Video Visit     Originating Location (pt. Location): Home    Distant Location (provider location):  On-site  Platform used for Video Visit: John

## 2025-02-24 ENCOUNTER — OFFICE VISIT (OUTPATIENT)
Dept: FAMILY MEDICINE | Facility: CLINIC | Age: 30
End: 2025-02-24
Payer: COMMERCIAL

## 2025-02-24 VITALS
RESPIRATION RATE: 20 BRPM | HEIGHT: 70 IN | SYSTOLIC BLOOD PRESSURE: 118 MMHG | TEMPERATURE: 98.2 F | BODY MASS INDEX: 27.77 KG/M2 | HEART RATE: 64 BPM | DIASTOLIC BLOOD PRESSURE: 71 MMHG | WEIGHT: 194 LBS | OXYGEN SATURATION: 99 %

## 2025-02-24 DIAGNOSIS — M21.612 BILATERAL BUNIONS: ICD-10-CM

## 2025-02-24 DIAGNOSIS — Z01.818 PREOP GENERAL PHYSICAL EXAM: Primary | ICD-10-CM

## 2025-02-24 DIAGNOSIS — M21.611 BILATERAL BUNIONS: ICD-10-CM

## 2025-02-24 LAB
ERYTHROCYTE [DISTWIDTH] IN BLOOD BY AUTOMATED COUNT: 13 % (ref 10–15)
HCT VFR BLD AUTO: 43.1 % (ref 40–53)
HGB BLD-MCNC: 14.7 G/DL (ref 13.3–17.7)
MCH RBC QN AUTO: 31.7 PG (ref 26.5–33)
MCHC RBC AUTO-ENTMCNC: 34.1 G/DL (ref 31.5–36.5)
MCV RBC AUTO: 93 FL (ref 78–100)
PLATELET # BLD AUTO: 222 10E3/UL (ref 150–450)
RBC # BLD AUTO: 4.63 10E6/UL (ref 4.4–5.9)
WBC # BLD AUTO: 8.6 10E3/UL (ref 4–11)

## 2025-02-24 PROCEDURE — 99213 OFFICE O/P EST LOW 20 MIN: CPT | Performed by: PHYSICIAN ASSISTANT

## 2025-02-24 PROCEDURE — 36415 COLL VENOUS BLD VENIPUNCTURE: CPT | Performed by: PHYSICIAN ASSISTANT

## 2025-02-24 PROCEDURE — 85027 COMPLETE CBC AUTOMATED: CPT | Performed by: PHYSICIAN ASSISTANT

## 2025-02-24 PROCEDURE — 80048 BASIC METABOLIC PNL TOTAL CA: CPT | Performed by: PHYSICIAN ASSISTANT

## 2025-02-24 ASSESSMENT — PAIN SCALES - GENERAL: PAINLEVEL_OUTOF10: NO PAIN (0)

## 2025-02-24 NOTE — PATIENT INSTRUCTIONS
How to Take Your Medication Before Surgery  Preoperative Medication Instructions   Antiplatelet or Anticoagulation Medication Instructions   - We reviewed the medication list and the patient is not on an antiplatelet or anticoagulation medications.    Additional Medication Instructions  We reviewed the medication list and there are no chronic medications that need to be adjusted for this procedure.       Patient Education   Preparing for Your Surgery  For Adults  Getting started  In most cases, a nurse will call to review your health history and instructions. They will give you an arrival time based on your scheduled surgery time. Please be ready to share:  Your doctor's clinic name and phone number  Your medical, surgical, and anesthesia history  A list of allergies and sensitivities  A list of medicines, including herbal treatments and over-the-counter drugs  Whether the patient has a legal guardian (ask how to send us the papers in advance)  Note: You may not receive a call if you were seen at our PAC (Preoperative Assessment Center).  Please tell us if you're pregnant--or if there's any chance you might be pregnant. Some surgeries may injure a fetus (unborn baby), so they require a pregnancy test. Surgeries that are safe for a fetus don't always need a test, and you can choose whether to have one.   Preparing for surgery  Within 10 to 30 days of surgery: Have a pre-op exam (sometimes called an H&P, or History and Physical). This can be done at a clinic or pre-operative center.  If you're having a , you may not need this exam. Talk to your care team.  At your pre-op exam, talk to your care team about all medicines you take. (This includes CBD oil and any drugs, such as THC, marijuana, and other forms of cannabis.) If you need to stop any medicine before surgery, ask when to start taking it again.  This is for your safety. Many medicines and drugs can make you bleed too much during surgery. Some change  how well surgery (anesthesia) drugs work.  Call your insurance company to let them know you're having surgery. (If you don't have insurance, call [phone].)  Call your clinic if there's any change in your health. This includes a scrape or scratch near the surgery site, or any signs of a cold (sore throat, runny nose, cough, rash, fever).  Eating and drinking guidelines  For your safety: Unless your surgeon tells you otherwise, follow the guidelines below.  Eat and drink as normal until 8 hours before you arrive for surgery. After that, no food or milk. You can spit out gum when you arrive.  Drink clear liquids until 2 hours before you arrive. These are liquids you can see through, like water, Gatorade, and Propel Water. They also include plain black coffee and tea (no cream or milk).  No alcohol for 24 hours before you arrive. The night before surgery, stop any drinks that contain THC.  If your care team tells you to take medicine on the morning of surgery, it's okay to take it with a sip of water. No other medicines or drugs are allowed (including CBD oil)--follow your care team's instructions.  If you have questions the day of surgery, call your hospital or surgery center.   Preventing infection  Shower or bathe the night before and the morning of surgery. Follow the instructions your clinic gave you. (If no instructions, use regular soap.)  Don't shave or clip hair near your surgery site. We'll remove the hair if needed.  Don't smoke or vape the morning of surgery. No chewing tobacco for 6 hours before you arrive. A nicotine patch is okay. You may spit out nicotine gum when you arrive.  For some surgeries, the surgeon will tell you to fully quit smoking and nicotine.  We will make every effort to keep you safe from infection. We will:  Clean our hands often with soap and water (or an alcohol-based hand rub).  Clean the skin at your surgery site with a special soap that kills germs.  Give you a special gown to  keep you warm. (Cold raises the risk of infection.)  Wear hair covers, masks, gowns, and gloves during surgery.  Give antibiotic medicine, if prescribed. Not all surgeries need this medicine.  What to bring on the day of surgery  Photo ID and insurance card  Copy of your health care directive, if you have one  Glasses and hearing aids (bring cases)  You can't wear contacts during surgery  Inhaler and eye drops, if you use them (tell us about these when you arrive)  CPAP machine or breathing device, if you use them  A few personal items, if spending the night  If you have . . .  A pacemaker, ICD (cardiac defibrillator), or other implant: Bring the ID card.  An implanted stimulator: Bring the remote control.  A legal guardian: Bring a copy of the certified (court-stamped) guardianship papers.  Please remove any jewelry, including body piercings. Leave jewelry and other valuables at home.  If you're going home the day of surgery  You must have a responsible adult drive you home. They should stay with you overnight as well.  If you don't have someone to stay with you, and you aren't safe to go home alone, we may keep you overnight. Insurance often won't pay for this.  After surgery  If it's hard to control your pain or you need more pain medicine, please call your surgeon's office.  Questions?   If you have any questions for your care team, list them here:   ____________________________________________________________________________________________________________________________________________________________________________________________________________________________________________________________  For informational purposes only. Not to replace the advice of your health care provider. Copyright   2003, 2019 [facility]. All rights reserved. Clinically reviewed by Mark Alamo MD. [employer] [id number] - REV 08/24.

## 2025-02-24 NOTE — PROGRESS NOTES
Preoperative Evaluation  [facility]  [address] [facility] [address]  Phone: [phone]  Primary Provider: Melanie Turpin  Pre-op Performing Provider: WGEN NEAL PA-C  Feb 24, 2025 2/24/2025   Surgical Information   What procedure is being done? bunionectomy   Facility or Hospital where procedure/surgery will be performed: [facility]   Who is doing the procedure / surgery? [facility]   Date of surgery / procedure: march 18th   Time of surgery / procedure: n/A   Where do you plan to recover after surgery? at home with family     Fax number for surgical facility: [facility]-[phone]    Assessment & Plan     The proposed surgical procedure is considered LOW risk.    Preop general physical exam  EKG and CXR not indicated.   - CBC with platelets; Future  - Basic metabolic panel  (Ca, Cl, CO2, Creat, Gluc, K, Na, BUN); Future    Bilateral bunions  See surgeon's notes.          - No identified additional risk factors other than previously addressed    Preoperative Medication Instructions  Antiplatelet or Anticoagulation Medication Instructions   - We reviewed the medication list and the patient is not on an antiplatelet or anticoagulation medications.    Additional Medication Instructions  We reviewed the medication list and there are no chronic medications that need to be adjusted for this procedure.    Recommendation  Approval given to proceed with proposed procedure, without further diagnostic evaluation.    Subjective   Jose G is a 29 year old, presenting for the following:  Pre-Op Exam          2/24/2025     4:13 PM   Additional Questions   Roomed by alvaro   Accompanied by self         2/24/2025     4:13 PM   Patient Reported Additional Medications   Patient reports taking the following new medications none     HPI related to upcoming procedure: Jose G is a pleasant 29 year old male who presents to  clinic at the request of a physician at [facility] for preop optimization prior to undergoing what sounds like a bunionectomy. He is not certain if it will be the right or left foot yet as both are problematic. He sees his surgeon later this week. He has no serious cardiac or respiratory risk factors. Does smoke marijuana.         2/24/2025   Pre-Op Questionnaire   Have you ever had a heart attack or stroke? No   Have you ever had surgery on your heart or blood vessels, such as a stent placement, a coronary artery bypass, or surgery on an artery in your head, neck, heart, or legs? No   Do you have chest pain with activity? No   Do you have a history of heart failure? No   Do you currently have a cold, bronchitis or symptoms of other infection? No   Do you have a cough, shortness of breath, or wheezing? No   Do you or anyone in your family have previous history of blood clots? No   Do you or does anyone in your family have a serious bleeding problem such as prolonged bleeding following surgeries or cuts? No   Have you ever had problems with anemia or been told to take iron pills? No   Have you had any abnormal blood loss such as black, tarry or bloody stools? No   Have you ever had a blood transfusion? No   Are you willing to have a blood transfusion if it is medically needed before, during, or after your surgery? Yes   Have you or any of your relatives ever had problems with anesthesia? No   Do you have sleep apnea, excessive snoring or daytime drowsiness? No   Do you have any artifical heart valves or other implanted medical devices like a pacemaker, defibrillator, or continuous glucose monitor? No   Do you have artificial joints? No   Are you allergic to latex? No     Health Care Directive  Patient does not have a Health Care Directive: Discussed advance care planning with patient; however, patient declined at this time.    Preoperative Review of    reviewed - no record of controlled substances  "prescribed.      There are no active problems to display for this patient.     Past Medical History:   Diagnosis Date    Depressive disorder     Substance abuse (H)      Past Surgical History:   Procedure Laterality Date    ORTHOPEDIC SURGERY       Current Outpatient Medications   Medication Sig Dispense Refill    Acetaminophen (TYLENOL PO) Take 325 mg by mouth (Patient not taking: Reported on 2/24/2025)      ibuprofen (ADVIL/MOTRIN) 600 MG tablet TAKE 1 TABLET BY MOUTH EVERY 6 TO 8 HOURS AS NEEDED FOR PAIN. (Patient not taking: Reported on 2/24/2025)         Allergies   Allergen Reactions    Cephalexin Rash    Penicillins Rash     Per patient        Social History     Tobacco Use    Smoking status: Former     Current packs/day: 0.50     Types: Cigarettes     Passive exposure: Never    Smokeless tobacco: Not on file   Substance Use Topics    Alcohol use: No     No family history on file.  History   Drug Use    Types: Marijuana             Review of Systems  Constitutional, neuro, ENT, endocrine, pulmonary, cardiac, gastrointestinal, genitourinary, musculoskeletal, integument and psychiatric systems are negative, except as otherwise noted.    Objective    /71   Pulse 64   Temp 98.2  F (36.8  C) (Oral)   Resp 20   Ht 1.778 m (5' 10\")   Wt 88 kg (194 lb)   SpO2 99%   BMI 27.84 kg/m     Estimated body mass index is 27.84 kg/m  as calculated from the following:    Height as of this encounter: 1.778 m (5' 10\").    Weight as of this encounter: 88 kg (194 lb).  Physical Exam  Constitutional:       Appearance: Normal appearance. He is not ill-appearing.   HENT:      Head: Normocephalic.      Right Ear: Tympanic membrane, ear canal and external ear normal.      Left Ear: Tympanic membrane, ear canal and external ear normal.      Ears:      Comments: Hearing grossly intact.      Nose: No rhinorrhea.      Mouth/Throat:      Lips: Pink.      Mouth: Mucous membranes are moist.      Dentition: Normal dentition.      " Tongue: No lesions.      Tonsils: No tonsillar exudate. 2+ on the right. 2+ on the left.   Eyes:      General: Lids are normal.      Extraocular Movements: Extraocular movements intact.      Conjunctiva/sclera: Conjunctivae normal.      Pupils: Pupils are equal, round, and reactive to light.   Neck:      Thyroid: No thyroid mass, thyromegaly or thyroid tenderness.      Trachea: Trachea normal.   Cardiovascular:      Rate and Rhythm: Normal rate and regular rhythm.      Pulses:           Posterior tibial pulses are 2+ on the right side and 2+ on the left side.      Heart sounds: Normal heart sounds. No murmur heard.  Pulmonary:      Effort: Pulmonary effort is normal.      Breath sounds: Normal breath sounds.   Abdominal:      General: Abdomen is flat. Bowel sounds are normal.      Palpations: Abdomen is soft.      Tenderness: There is no abdominal tenderness.      Hernia: No hernia is present.   Genitourinary:     Comments: Deferred  Musculoskeletal:      Cervical back: Normal range of motion.      Right lower leg: No edema.      Left lower leg: No edema.      Comments: Ambulatory without assistive Device  Moving all limbs with grossly normal AROM  See surgeon's notes for foot exams.    Lymphadenopathy:      Head:      Right side of head: No submental, submandibular, tonsillar or preauricular adenopathy.      Left side of head: No submental, submandibular, tonsillar or preauricular adenopathy.      Cervical: No cervical adenopathy.      Upper Body:      Right upper body: No supraclavicular adenopathy.   Skin:     General: Skin is warm and dry.      Findings: No lesion, rash or wound.      Nails: There is no clubbing.      Comments: Great art work.    Neurological:      General: No focal deficit present.      Mental Status: He is alert and oriented to person, place, and time.      Motor: Motor function is intact.      Coordination: Coordination is intact.      Gait: Gait is intact.      Deep Tendon Reflexes:       "Reflex Scores:       Patellar reflexes are 2+ on the right side and 2+ on the left side.     Comments: CN II-XII grossly intact with facial symmetry   Psychiatric:         Mood and Affect: Mood normal.         Behavior: Behavior is cooperative.         Thought Content: Thought content normal.         Judgment: Judgment normal.           No results for input(s): \"HGB\", \"PLT\", \"INR\", \"NA\", \"POTASSIUM\", \"CR\", \"A1C\" in the last 8760 hours.     Diagnostics  Labs pending at this time.  Results will be reviewed when available.   No EKG required for low risk surgery (cataract, skin procedure, breast biopsy, etc).    Revised Cardiac Risk Index (RCRI)  The patient has the following serious cardiovascular risks for perioperative complications:   - No serious cardiac risks = 0 points     RCRI Interpretation: 0 points: Class I (very low risk - 0.4% complication rate)    Signed Electronically by: GWEN NEAL PA-C  A copy of this evaluation report is provided to the requesting physician.        "

## 2025-02-26 LAB
ANION GAP SERPL CALCULATED.3IONS-SCNC: 11 MMOL/L (ref 7–15)
BUN SERPL-MCNC: 15 MG/DL (ref 6–20)
CALCIUM SERPL-MCNC: 10.1 MG/DL (ref 8.8–10.4)
CHLORIDE SERPL-SCNC: 104 MMOL/L (ref 98–107)
CREAT SERPL-MCNC: 0.92 MG/DL (ref 0.67–1.17)
EGFRCR SERPLBLD CKD-EPI 2021: >90 ML/MIN/1.73M2
GLUCOSE SERPL-MCNC: 82 MG/DL (ref 70–99)
HCO3 SERPL-SCNC: 27 MMOL/L (ref 22–29)
POTASSIUM SERPL-SCNC: 4.5 MMOL/L (ref 3.4–5.3)
SODIUM SERPL-SCNC: 142 MMOL/L (ref 135–145)

## 2025-05-20 ENCOUNTER — TELEPHONE (OUTPATIENT)
Dept: UROLOGY | Facility: CLINIC | Age: 30
End: 2025-05-20

## 2025-05-20 NOTE — TELEPHONE ENCOUNTER
[facility] Call Center    Phone Message    May a detailed message be left on voicemail: no     Reason for Call: Other: Patient called in requesting to reschedule his vasectomy procedure that is scheduled for today 5/20/25. Please review and call him back to discuss.      Action Taken: Message routed to:  Adult Clinics: Urology p [phone]    Travel Screening: Not Applicable     Date of Service:

## 2025-05-20 NOTE — TELEPHONE ENCOUNTER
5/20 Appointment is rescheduled for 06/17/2025.    [facility]   Dermatology, Urology, General Surgery Specialties   [facility]

## 2025-06-17 ENCOUNTER — TELEPHONE (OUTPATIENT)
Dept: UROLOGY | Facility: CLINIC | Age: 30
End: 2025-06-17

## 2025-06-17 NOTE — TELEPHONE ENCOUNTER
6/17 Appointment is rescheduled for 7/15/2025 at 8am in [place] with Dr. Yi.    [facility]   Dermatology, Urology, General Surgery Specialties   [facility]

## 2025-06-17 NOTE — TELEPHONE ENCOUNTER
[facility] Call Center    Phone Message    May a detailed message be left on voicemail: yes     Reason for Call: Other: pt needs to reschedule his vasectomy     Action Taken: Other: urology    Travel Screening: Not Applicable     Date of Service:

## 2025-07-15 ENCOUNTER — OFFICE VISIT (OUTPATIENT)
Dept: UROLOGY | Facility: CLINIC | Age: 30
End: 2025-07-15
Payer: COMMERCIAL

## 2025-07-15 VITALS — SYSTOLIC BLOOD PRESSURE: 113 MMHG | DIASTOLIC BLOOD PRESSURE: 78 MMHG | HEART RATE: 71 BPM

## 2025-07-15 DIAGNOSIS — Z30.2 ENCOUNTER FOR STERILIZATION: Primary | ICD-10-CM

## 2025-07-15 PROCEDURE — 55250 REMOVAL OF SPERM DUCT(S): CPT | Performed by: UROLOGY

## 2025-07-15 PROCEDURE — 99207 PR NO CHARGE LOS: CPT | Performed by: UROLOGY

## 2025-07-15 NOTE — PATIENT INSTRUCTIONS
Vasectomy Post-Op Care Instructions     You may go home after the procedure is completed. There may be some pain in your groin for 3 or 4 days after the operation. Some blood or yellow liquid may ooze from the cuts on the outside. The area around the cuts may swell and bruise. The sutures will dissolve on their own and do not require removal by the physician.    The first 48 hours after the procedure are crucial to healing. Generally, you may feel very good the day after the procedure, but that does not mean it is time to go back to normal activities. Resuming normal activities too soon is likely to cause internal bleeding and lots of pain.      Your provider may advise the following ways to care for yourself after the procedure:    Put an ice bag or package of frozen peas covered by a thin towel over the scrotum after the procedure. Leave the ice on the area for 30 minutes and then take it off for 30 minutes. Do this off and on for at least 24 hours.      Avoid all heavy lifting (greater than 10 pounds) for at least one week.    Wear a jockstrap or tight-fitting underwear for approximately one week to support the scrotum (testicles) and help reduce discomfort.    Take a pain reliever, such as Acetaminophen (Tylenol) or Ibuprofen (Advil), for any pain after the procedure. Your provider may prescribe a stronger pain medicine if it is needed.    You should be able to return to work in 48 hours, but strenuous activity should be avoided for two weeks.    Do not submerge the incision for 1 week following the procedure.    Ejaculation should be avoided for one week to allow the area to heal.    Follow-Up    You will need to have a negative post vasectomy analyses (no sperm seen) before you can discontinue birth control.    Semen Analysis   Schedule the post-vasectomy semen analysis three months after your vasectomy. You will need to ejaculate at least 20 times between your surgery and the first sample. Clinic staff will  contact your regarding your results via phone.    You will be given a container after the procedure. Collect the specimen at home by masturbation only (no lubrication, powders, saliva, or intercourse can be used) and bring it to the laboratory. You must have an appointment to drop off your specimen. The specimen needs to be delivered to the lab within 30-45 minutes.    Call [phone] with questions. For concerns or questions after hours or on weekends, please page the Urology Resident on-call: [phone].

## 2025-07-15 NOTE — PROGRESS NOTES
OFFICE VASECTOMY OPERATIVE NOTE  TARIK GROVE     DATE: 07/15/25  PATIENT: Jose G Gonzalez    YOB: 1995    Jose G Gonzalez is a 30 year old male.  He has 3 children and he wishes a vasectomy for birth control.  He has read the brochure and he has shaved himself.  I reviewed the vasectomy procedure with him explaining that it would be done with a local anesthetic given just in the location where the vasectomy would be done.  It would be done through incisions with the removal of segments of the vasa, cauterization of the ends, and burying the ends separate with sutures.      Pt. Understands:  -1/1000-1/3000 risk of future pregnancy even with perfectly done vasectomy  -vasectomy is a permanent procedure    -he may cryopreserve sperm if he wishes   -1-5% risk of post-vasectomy pain syndrome   -1-5% risk of complication, primarily infection or bleeding  - he needs to have a semen sample that shows no sperm before getting approval for unprotected intercourse.      Complications such as bleeding, infection, and damage to other tissues in the area were discussed. I recommended that an ice bag be placed on the scrotum off and on tonight to help reduce pain and swelling.      He was reminded that he was not sterile immediately after the vasectomy that it would take at least 20 ejaculations to empty the vas of any remaining sperm.  He was not to provide a semen sample until after the 20th ejaculation and not before 12 weeks after the vas. He was  to fulfill both of those requirements.   He understands it is his responsibility to find out the results of the vas before proceeding with intercourse without birth control protection.  Other items discussed were activity afterwards, returning to work, voluntary physical activity,  resuming sexual activity, clothing to wear, bathing, and care of the vas site and expected changes in the site as healing progresses.  After signing the permit, bilateral vasectomy was  done as described below.     ANESTHESIA: Local    DETAILS OF PROCEDURE: The risks of the procedure were explained in detail to the patient and informed consent was obtained. The patient was placed supine on the procedure table and the penis and scrotum were prepped and draped in the standard sterile fashion. The right vas deferens was isolated and brought up to the skin. 1% lidocaine local anesthesia was used to infiltrate the skin and the spermatic cord. A small incision was created and adventitial tissues were swept away from the vas. A 1 cm segment of the vas was excised and sent for pathology. The proximal and distal lumina of the vas were cauterized and then each segment was tied off in a knuckling-fashion with a 3-0 vicryl suture. Hemostasis was ensured and the segments were released back into the scrotum. Meticulous hemostasis was achieved. The skin was closed with 3-0 chromic suture in a horizontal mattress fashion. Next the left vas was brought to the skin and a vasectomy was performed in the similar fashion. The skin was closed with 3-0 chromic suture in a horizontal mattress fashion.    COMPLICATIONS: None    TAKE HOME MEDICATIONS: Tylenol every 6 hours, PRN    DISMISSAL INSTRUCTIONS:  - Ice pack to scrotum 15 to 20 minutes each hour awake for 36 to 40 hours.  - No strenuous activity or ejaculation for at least 7 days.  - No unprotected sexual activity until proven azoospermia on semen samples at 3 months.  - Referred to patient handout for normal postop expectations and indications to contact nurse or physician.    M.D.: Tim Yi MD

## 2025-07-15 NOTE — NURSING NOTE
Jose G Gonzalez's goals for this visit include:   Chief Complaint   Patient presents with    Vasectomy     Voluntary sterilization        He requests these members of his care team be copied on today's visit information:       PCP: Melanie Turpin    Referring Provider:  Referred Self, MD  No address on file    /78 (BP Location: Right arm, Patient Position: Sitting, Cuff Size: Adult Large)   Pulse 71       Do you need any medication refills at today's visit?     Alessandra Spence LPN on 7/15/2025 at 8:03 AM